# Patient Record
Sex: FEMALE | Race: WHITE | NOT HISPANIC OR LATINO | Employment: UNEMPLOYED | URBAN - METROPOLITAN AREA
[De-identification: names, ages, dates, MRNs, and addresses within clinical notes are randomized per-mention and may not be internally consistent; named-entity substitution may affect disease eponyms.]

---

## 2019-04-23 ENCOUNTER — TELEPHONE (OUTPATIENT)
Dept: OBGYN CLINIC | Facility: CLINIC | Age: 29
End: 2019-04-23

## 2020-01-08 ENCOUNTER — HOSPITAL ENCOUNTER (EMERGENCY)
Facility: HOSPITAL | Age: 30
Discharge: HOME | End: 2020-01-08
Attending: EMERGENCY MEDICINE | Admitting: EMERGENCY MEDICINE
Payer: COMMERCIAL

## 2020-01-08 VITALS
HEIGHT: 60 IN | BODY MASS INDEX: 26.27 KG/M2 | DIASTOLIC BLOOD PRESSURE: 88 MMHG | TEMPERATURE: 97.7 F | SYSTOLIC BLOOD PRESSURE: 145 MMHG | RESPIRATION RATE: 18 BRPM | HEART RATE: 89 BPM | OXYGEN SATURATION: 98 % | WEIGHT: 133.82 LBS

## 2020-01-08 DIAGNOSIS — J06.9 VIRAL URI WITH COUGH: Primary | ICD-10-CM

## 2020-01-08 PROCEDURE — 99284 EMERGENCY DEPT VISIT MOD MDM: CPT | Performed by: PHYSICIAN ASSISTANT

## 2020-01-08 PROCEDURE — 99283 EMERGENCY DEPT VISIT LOW MDM: CPT

## 2020-01-08 RX ORDER — ALBUTEROL SULFATE 90 UG/1
2 AEROSOL, METERED RESPIRATORY (INHALATION) EVERY 6 HOURS PRN
Qty: 1 INHALER | Refills: 0 | Status: SHIPPED | OUTPATIENT
Start: 2020-01-08 | End: 2020-02-07

## 2020-01-08 RX ORDER — BENZONATATE 100 MG/1
100 CAPSULE ORAL 3 TIMES DAILY PRN
Qty: 20 CAPSULE | Refills: 0 | Status: SHIPPED | OUTPATIENT
Start: 2020-01-08

## 2020-01-10 ENCOUNTER — HOSPITAL ENCOUNTER (EMERGENCY)
Facility: HOSPITAL | Age: 30
Discharge: HOME | End: 2020-01-10
Attending: EMERGENCY MEDICINE
Payer: COMMERCIAL

## 2020-01-10 VITALS
BODY MASS INDEX: 25.97 KG/M2 | OXYGEN SATURATION: 97 % | TEMPERATURE: 99.1 F | HEART RATE: 112 BPM | SYSTOLIC BLOOD PRESSURE: 148 MMHG | DIASTOLIC BLOOD PRESSURE: 84 MMHG | RESPIRATION RATE: 18 BRPM | WEIGHT: 133 LBS

## 2020-01-10 DIAGNOSIS — J11.1 INFLUENZA: ICD-10-CM

## 2020-01-10 DIAGNOSIS — J40 BRONCHITIS: Primary | ICD-10-CM

## 2020-01-10 LAB
FLUAV RNA NPH QL NAA+PROBE: ABNORMAL
FLUBV RNA NPH QL NAA+PROBE: DETECTED
RSV RNA NPH QL NAA+PROBE: ABNORMAL

## 2020-01-10 PROCEDURE — 87631 RESP VIRUS 3-5 TARGETS: CPT | Performed by: PHYSICIAN ASSISTANT

## 2020-01-10 PROCEDURE — 99284 EMERGENCY DEPT VISIT MOD MDM: CPT | Performed by: PHYSICIAN ASSISTANT

## 2020-01-10 PROCEDURE — 99283 EMERGENCY DEPT VISIT LOW MDM: CPT

## 2020-01-10 RX ORDER — PREDNISONE 20 MG/1
60 TABLET ORAL ONCE
Status: COMPLETED | OUTPATIENT
Start: 2020-01-10 | End: 2020-01-10

## 2020-01-10 RX ORDER — PREDNISONE 20 MG/1
40 TABLET ORAL DAILY
Qty: 8 TABLET | Refills: 0 | Status: SHIPPED | OUTPATIENT
Start: 2020-01-10 | End: 2020-01-14

## 2020-01-10 RX ADMIN — PREDNISONE 60 MG: 20 TABLET ORAL at 09:30

## 2020-01-10 NOTE — ED PROVIDER NOTES
History  Chief Complaint   Patient presents with    Fever - 9 weeks to 74 years     pt seen in [facility] ED 3 days ago for same  c/o fever, body aches, decreased appetite  Denies nausea/vomiting/diarrhea    Cough     non productive x 10 days     33 y/o female presenting with a dry nonproductive cough x3 days  Patient states that she was sick about 2 weeks ago and her cough had improved and resolution of symptoms occurred however then started with a cough once again over the past 2 days with low-grade temperatures at 100 5  Afebrile today, did not take any medication for her fevers  She is taking over-the-counter cough medication as well as her inhaler however persists with a dry nonproductive cough  Prior history of smoking  Concerned as she does have little once in the home  She does not have a family doctor in this area  Mild shortness of breath with the cough  Denies chest pain, nausea, vomiting, diarrhea, difficulty swallowing, facial pain or pressure  Prior to Admission Medications   Prescriptions Last Dose Informant Patient Reported? Taking? albuterol (PROVENTIL HFA,VENTOLIN HFA) 90 mcg/act inhaler 1/10/2020 at Unknown time  No Yes   Sig: Inhale 2 puffs every 6 (six) hours as needed for wheezing or shortness of breath   benzonatate (TESSALON PERLES) 100 mg capsule 1/9/2020 at Unknown time  No Yes   Sig: Take 1 capsule (100 mg total) by mouth 3 (three) times a day as needed for cough      Facility-Administered Medications: None       History reviewed  No pertinent past medical history  History reviewed  No pertinent surgical history  History reviewed  No pertinent family history  I have reviewed and agree with the history as documented  Social History     Tobacco Use    Smoking status: Former Smoker    Smokeless tobacco: Never Used   Substance Use Topics    Alcohol use: Yes     Comment: occasionally    Drug use: Never        Review of Systems   Constitutional: Positive for fever  Negative for activity change, appetite change, chills, diaphoresis and fatigue  HENT: Negative  Negative for congestion, dental problem, ear pain, facial swelling, mouth sores, postnasal drip, sinus pressure, sore throat and trouble swallowing  Eyes: Negative  Respiratory: Positive for cough  Negative for apnea, choking, chest tightness, shortness of breath, wheezing and stridor  Cardiovascular: Negative  Negative for chest pain  Gastrointestinal: Negative  Negative for abdominal distention, abdominal pain, blood in stool, constipation, diarrhea, nausea and vomiting  Genitourinary: Negative  Musculoskeletal: Negative  Negative for arthralgias, neck pain and neck stiffness  Skin: Negative  Negative for rash  Neurological: Negative  Negative for facial asymmetry and headaches  All other systems reviewed and are negative  Physical Exam  Physical Exam   Constitutional: She is oriented to person, place, and time  She appears well-developed and well-nourished  No distress  HENT:   Head: Normocephalic and atraumatic  Right Ear: External ear normal    Left Ear: External ear normal    Nose: Nose normal    Mouth/Throat: Oropharynx is clear and moist  No oropharyngeal exudate  Mild amount of erythema noted  No swelling, exudate, or uvula deviation noted of mouth  No discoloration, asymmetries or swelling of the face  No ulcerations, fluctuance, induration or drainage noted  No petechiae noted on palate  Able to swallow without difficulty  Full ROM of jaw  Eyes: Pupils are equal, round, and reactive to light  Conjunctivae are normal  Right eye exhibits no discharge  Left eye exhibits no discharge  No scleral icterus  Neck: Normal range of motion  Neck supple  No tracheal deviation present  Cardiovascular: Normal rate, regular rhythm, normal heart sounds and intact distal pulses  Exam reveals no friction rub  No murmur heard    Pulmonary/Chest: Effort normal and breath sounds normal  No stridor  No respiratory distress  She has no wheezes  She has no rales  She exhibits no tenderness  SpO2 is 97%, within normal limits, resting comfortably  No cyanosis or pallor  Patient speaking full sentences without any difficulty, no shortness of breath  No wheezes, rhonchi or crackles noted on exam    Abdominal: Soft  Bowel sounds are normal  She exhibits no distension  There is no tenderness  There is no rebound and no guarding  Lymphadenopathy:     She has no cervical adenopathy  Neurological: She is alert and oriented to person, place, and time  Skin: Skin is warm and dry  Capillary refill takes less than 2 seconds  No rash noted  She is not diaphoretic  No erythema  No pallor  No sandpaper rash noted  No other rashes noted  Good coloration of skin  Nursing note and vitals reviewed  Vital Signs  ED Triage Vitals   Temperature Pulse Respirations Blood Pressure SpO2   01/10/20 0857 01/10/20 0857 01/10/20 0857 01/10/20 0857 01/10/20 0857   99 1 °F (37 3 °C) (!) 112 18 148/84 97 %      Temp Source Heart Rate Source Patient Position - Orthostatic VS BP Location FiO2 (%)   01/10/20 0857 -- 01/10/20 0857 01/10/20 0857 --   Oral  Sitting Right arm       Pain Score       01/10/20 0859       8           Vitals:    01/10/20 0857   BP: 148/84   Pulse: (!) 112   Patient Position - Orthostatic VS: Sitting         Visual Acuity      ED Medications  Medications   predniSONE tablet 60 mg (60 mg Oral Given 1/10/20 0930)       Diagnostic Studies  Results Reviewed     Procedure Component Value Units Date/Time    Influenza A/B and RSV PCR [[id number]] Collected:  01/10/20 0930    Lab Status:  No result Specimen:  Nasopharyngeal Swab                  No orders to display              Procedures  Procedures         ED Course                               MDM  Number of Diagnoses or Management Options  Diagnosis management comments:  Will swab for flu per patient request however discussed with patient that she likely continues with a viral illness and early signs of bronchitis as she has had history of this before in the past   Will treat symptomatically  Patient is informed to return to the emergency department for worsening of symptoms and was given proper education regarding their diagnosis and symptoms  Otherwise the patient is informed to follow up with their primary care doctor for re-evaluation  The patient verbalizes understanding and agrees with above assessment and plan  All questions were answered  Please Note: Fluency Direct voice recognition software may have been used in the creation of this document  Wrong words or sound a like substitutions may have occurred due to the inherent limitations of the voice software  Amount and/or Complexity of Data Reviewed  Clinical lab tests: ordered  Review and summarize past medical records: yes  Independent visualization of images, tracings, or specimens: yes          Disposition  Final diagnoses:   Bronchitis     Time reflects when diagnosis was documented in both MDM as applicable and the Disposition within this note     Time User Action Codes Description Comment    1/10/2020  9:29 AM Haydee Hunt Add [J40] Bronchitis       ED Disposition     ED Disposition Condition Date/Time Comment    Discharge Stable Fri Pj 10, 2020  9:29 AM Gus Murillo discharge to home/self care              Follow-up Information     Follow up With Specialties Details Why Contact Info Additional [address] Emergency Department Emergency Medicine Go to  If symptoms worsen such as chest pain, high fevers etc [address] [facility]  [phone] [facility], [place], [place], [address] [place] [address]  Schedule an appointment as soon as possible for a visit   Ching Carter [phone]           Patient's Medications   Discharge Prescriptions    PREDNISONE 20 MG TABLET    Take 2 tablets (40 mg total) by mouth daily for 4 days       Start Date: 1/10/2020 End Date: 1/14/2020       Order Dose: 40 mg       Quantity: 8 tablet    Refills: 0     No discharge procedures on file      ED Provider  Electronically Signed by           Renaldo Huang PA-C  01/10/20 0912

## 2020-03-04 ENCOUNTER — HOSPITAL ENCOUNTER (EMERGENCY)
Facility: HOSPITAL | Age: 30
Discharge: HOME | End: 2020-03-04
Attending: EMERGENCY MEDICINE | Admitting: EMERGENCY MEDICINE
Payer: COMMERCIAL

## 2020-03-04 VITALS
OXYGEN SATURATION: 98 % | SYSTOLIC BLOOD PRESSURE: 123 MMHG | HEART RATE: 110 BPM | WEIGHT: 130 LBS | DIASTOLIC BLOOD PRESSURE: 74 MMHG | RESPIRATION RATE: 16 BRPM | TEMPERATURE: 97.6 F | BODY MASS INDEX: 25.39 KG/M2

## 2020-03-04 DIAGNOSIS — N93.9 VAGINAL BLEEDING: Primary | ICD-10-CM

## 2020-03-04 LAB
ANION GAP SERPL CALCULATED.3IONS-SCNC: 9 MMOL/L (ref 4–13)
B-HCG SERPL-ACNC: 778 MIU/ML
BACTERIA UR QL AUTO: ABNORMAL /HPF
BASOPHILS # BLD AUTO: 0.04 THOUSANDS/ΜL (ref 0–0.1)
BASOPHILS NFR BLD AUTO: 1 % (ref 0–1)
BILIRUB UR QL STRIP: NEGATIVE
BUN SERPL-MCNC: 11 MG/DL (ref 5–25)
CALCIUM SERPL-MCNC: 8.5 MG/DL (ref 8.3–10.1)
CHLORIDE SERPL-SCNC: 103 MMOL/L (ref 100–108)
CLARITY UR: CLEAR
CO2 SERPL-SCNC: 26 MMOL/L (ref 21–32)
COLOR UR: YELLOW
CREAT SERPL-MCNC: 0.67 MG/DL (ref 0.6–1.3)
EOSINOPHIL # BLD AUTO: 0.12 THOUSAND/ΜL (ref 0–0.61)
EOSINOPHIL NFR BLD AUTO: 2 % (ref 0–6)
ERYTHROCYTE [DISTWIDTH] IN BLOOD BY AUTOMATED COUNT: 13 % (ref 11.6–15.1)
EXT PREG TEST URINE: POSITIVE
EXT. CONTROL ED NAV: NORMAL
GFR SERPL CREATININE-BSD FRML MDRD: 119 ML/MIN/1.73SQ M
GLUCOSE SERPL-MCNC: 98 MG/DL (ref 65–140)
GLUCOSE UR STRIP-MCNC: NEGATIVE MG/DL
HCT VFR BLD AUTO: 38.1 % (ref 34.8–46.1)
HGB BLD-MCNC: 12.5 G/DL (ref 11.5–15.4)
HGB UR QL STRIP.AUTO: ABNORMAL
IMM GRANULOCYTES # BLD AUTO: 0.02 THOUSAND/UL (ref 0–0.2)
IMM GRANULOCYTES NFR BLD AUTO: 0 % (ref 0–2)
KETONES UR STRIP-MCNC: NEGATIVE MG/DL
LEUKOCYTE ESTERASE UR QL STRIP: NEGATIVE
LYMPHOCYTES # BLD AUTO: 2.03 THOUSANDS/ΜL (ref 0.6–4.47)
LYMPHOCYTES NFR BLD AUTO: 26 % (ref 14–44)
MCH RBC QN AUTO: 29.7 PG (ref 26.8–34.3)
MCHC RBC AUTO-ENTMCNC: 32.8 G/DL (ref 31.4–37.4)
MCV RBC AUTO: 91 FL (ref 82–98)
MONOCYTES # BLD AUTO: 0.6 THOUSAND/ΜL (ref 0.17–1.22)
MONOCYTES NFR BLD AUTO: 8 % (ref 4–12)
MUCOUS THREADS UR QL AUTO: ABNORMAL
NEUTROPHILS # BLD AUTO: 5.09 THOUSANDS/ΜL (ref 1.85–7.62)
NEUTS SEG NFR BLD AUTO: 63 % (ref 43–75)
NITRITE UR QL STRIP: NEGATIVE
NON-SQ EPI CELLS URNS QL MICRO: ABNORMAL /HPF
NRBC BLD AUTO-RTO: 0 /100 WBCS
PH UR STRIP.AUTO: 5.5 [PH]
PLATELET # BLD AUTO: 196 THOUSANDS/UL (ref 149–390)
PMV BLD AUTO: 10.8 FL (ref 8.9–12.7)
POTASSIUM SERPL-SCNC: 3.3 MMOL/L (ref 3.5–5.3)
PROT UR STRIP-MCNC: NEGATIVE MG/DL
RBC # BLD AUTO: 4.21 MILLION/UL (ref 3.81–5.12)
RBC #/AREA URNS AUTO: ABNORMAL /HPF
SODIUM SERPL-SCNC: 138 MMOL/L (ref 136–145)
SP GR UR STRIP.AUTO: >=1.03 (ref 1–1.03)
UROBILINOGEN UR QL STRIP.AUTO: 0.2 E.U./DL
WBC # BLD AUTO: 7.9 THOUSAND/UL (ref 4.31–10.16)
WBC #/AREA URNS AUTO: ABNORMAL /HPF

## 2020-03-04 PROCEDURE — 84702 CHORIONIC GONADOTROPIN TEST: CPT | Performed by: EMERGENCY MEDICINE

## 2020-03-04 PROCEDURE — 99282 EMERGENCY DEPT VISIT SF MDM: CPT | Performed by: EMERGENCY MEDICINE

## 2020-03-04 PROCEDURE — 99284 EMERGENCY DEPT VISIT MOD MDM: CPT

## 2020-03-04 PROCEDURE — 36415 COLL VENOUS BLD VENIPUNCTURE: CPT | Performed by: EMERGENCY MEDICINE

## 2020-03-04 PROCEDURE — 80048 BASIC METABOLIC PNL TOTAL CA: CPT | Performed by: EMERGENCY MEDICINE

## 2020-03-04 PROCEDURE — 81025 URINE PREGNANCY TEST: CPT | Performed by: EMERGENCY MEDICINE

## 2020-03-04 PROCEDURE — 85025 COMPLETE CBC W/AUTO DIFF WBC: CPT | Performed by: EMERGENCY MEDICINE

## 2020-03-04 PROCEDURE — 81001 URINALYSIS AUTO W/SCOPE: CPT | Performed by: EMERGENCY MEDICINE

## 2020-03-04 NOTE — ED NOTES
Pt states she can "no longer wait jan I have to get my kid"  Dr Jane Antonio notified  Pt dispo to left after provider evaluation        Melody Larkin RN  03/04/20 1547

## 2020-03-04 NOTE — ED PROVIDER NOTES
History  Chief Complaint   Patient presents with    Vaginal Bleeding - Pregnant     states had heavy bleeding yesterday   states took two pregnancy tests yesterday and they were both positive  no bleeding or pain today     Patient is a  2 para 2 with LMP was  to   Patient is usually quite regular, and when she missed her period this week she took a pregnancy test yesterday twice which was positive  Patient started having some spotting, followed by more heavy menstrual flow including clots  Today she says she has only light bleeding and no abdominal pain  She denies cramps, no fever, no dysuria no  vomiting, but she states she has mild nausea which she usually gets during her menstrual cycles  Prior to Admission Medications   Prescriptions Last Dose Informant Patient Reported? Taking?   benzonatate (TESSALON PERLES) 100 mg capsule Not Taking at Unknown time  No No   Sig: Take 1 capsule (100 mg total) by mouth 3 (three) times a day as needed for cough   Patient not taking: Reported on 3/4/2020      Facility-Administered Medications: None       History reviewed  No pertinent past medical history  History reviewed  No pertinent surgical history  History reviewed  No pertinent family history  I have reviewed and agree with the history as documented  E-Cigarette/Vaping    E-Cigarette Use Never User      E-Cigarette/Vaping Substances     Social History     Tobacco Use    Smoking status: Former Smoker    Smokeless tobacco: Never Used   Substance Use Topics    Alcohol use: Yes     Comment: occasionally    Drug use: Never       Review of Systems   Constitutional: Negative for chills and fever  HENT: Negative for congestion and sore throat  Eyes: Negative for visual disturbance  Respiratory: Negative for cough and shortness of breath  Gastrointestinal: Positive for nausea  Negative for abdominal pain and vomiting     Genitourinary: Positive for menstrual problem and vaginal bleeding  Negative for dysuria, hematuria, vaginal discharge and vaginal pain  Musculoskeletal: Negative for back pain  Skin: Negative for rash  Neurological: Negative for weakness and headaches  Hematological: Does not bruise/bleed easily  Psychiatric/Behavioral: Negative for confusion  All other systems reviewed and are negative  Physical Exam  Physical Exam   Constitutional: She is oriented to person, place, and time  She appears well-developed and well-nourished  HENT:   Head: Normocephalic and atraumatic  Eyes: Conjunctivae are normal    Neck: Normal range of motion  Neck supple  Cardiovascular: Normal rate, regular rhythm and normal heart sounds  Pulmonary/Chest: Effort normal    Abdominal: Soft  Bowel sounds are normal  There is no tenderness  Musculoskeletal: Normal range of motion  Neurological: She is alert and oriented to person, place, and time  Skin: Skin is warm and dry  Capillary refill takes less than 2 seconds  Psychiatric: She has a normal mood and affect  Her behavior is normal    Nursing note and vitals reviewed        Vital Signs  ED Triage Vitals [03/04/20 1151]   Temperature Pulse Respirations Blood Pressure SpO2   97 6 °F (36 4 °C) (!) 110 16 123/74 98 %      Temp Source Heart Rate Source Patient Position - Orthostatic VS BP Location FiO2 (%)   Tympanic Monitor -- -- --      Pain Score       No Pain           Vitals:    03/04/20 1151   BP: 123/74   Pulse: (!) 110         Visual Acuity      ED Medications  Medications - No data to display    Diagnostic Studies  Results Reviewed     Procedure Component Value Units Date/Time    Quantitative hCG [[id number]]  (Abnormal) Collected:  03/04/20 1233    Lab Status:  Final result Specimen:  Blood from Arm, Left Updated:  03/04/20 1349     HCG, Quant 778 mIU/mL     Narrative:        Expected Ranges:     Approximate               Approximate HCG  Gestation age          Concentration ( mIU/mL)  _____________ ______________________   Dulcy Pluck                      HCG values  0 2-1                       5-50  1-2                           2-3                         100-5000  3-4                         500-65670  4-5                         1000-25436  5-6                         [id number]  6-8                         [id number]  8-12                        10207-622580      Basic metabolic panel [[id number]]  (Abnormal) Collected:  03/04/20 1233    Lab Status:  Final result Specimen:  Blood from Arm, Left Updated:  03/04/20 1302     Sodium 138 mmol/L      Potassium 3 3 mmol/L      Chloride 103 mmol/L      CO2 26 mmol/L      ANION GAP 9 mmol/L      BUN 11 mg/dL      Creatinine 0 67 mg/dL      Glucose 98 mg/dL      Calcium 8 5 mg/dL      eGFR 119 ml/min/1 73sq m     Narrative:       [facility] guidelines for Chronic Kidney Disease (CKD):     Stage 1 with normal or high GFR (GFR > 90 mL/min/1 73 square meters)    Stage 2 Mild CKD (GFR = 60-89 mL/min/1 73 square meters)    Stage 3A Moderate CKD (GFR = 45-59 mL/min/1 73 square meters)    Stage 3B Moderate CKD (GFR = 30-44 mL/min/1 73 square meters)    Stage 4 Severe CKD (GFR = 15-29 mL/min/1 73 square meters)    Stage 5 End Stage CKD (GFR <15 mL/min/1 73 square meters)  Note: GFR calculation is accurate only with a steady state creatinine    Urine Microscopic [[id number]]  (Abnormal) Collected:  03/04/20 1233    Lab Status:  Final result Specimen:  Urine, Clean Catch Updated:  03/04/20 1254     RBC, UA 1-2 /hpf      WBC, UA 1-2 /hpf      Epithelial Cells Occasional /hpf      Bacteria, UA Occasional /hpf      MUCUS THREADS Occasional    UA (URINE) with reflex to Scope [[id number]]  (Abnormal) Collected:  03/04/20 1233    Lab Status:  Final result Specimen:  Urine, Clean Catch Updated:  03/04/20 1243     Color, UA Yellow     Clarity, UA Clear     Specific Gravity, UA >=1 030     pH, UA 5 5     Leukocytes, UA Negative     Nitrite, UA Negative Protein, UA Negative mg/dl      Glucose, UA Negative mg/dl      Ketones, UA Negative mg/dl      Urobilinogen, UA 0 2 E U /dl      Bilirubin, UA Negative     Blood, UA Small    CBC and differential [[id number]] Collected:  03/04/20 1233    Lab Status:  Final result Specimen:  Blood from Arm, Left Updated:  03/04/20 1239     WBC 7 90 Thousand/uL      RBC 4 21 Million/uL      Hemoglobin 12 5 g/dL      Hematocrit 38 1 %      MCV 91 fL      MCH 29 7 pg      MCHC 32 8 g/dL      RDW 13 0 %      MPV 10 8 fL      Platelets 421 Thousands/uL      nRBC 0 /100 WBCs      Neutrophils Relative 63 %      Immat GRANS % 0 %      Lymphocytes Relative 26 %      Monocytes Relative 8 %      Eosinophils Relative 2 %      Basophils Relative 1 %      Neutrophils Absolute 5 09 Thousands/µL      Immature Grans Absolute 0 02 Thousand/uL      Lymphocytes Absolute 2 03 Thousands/µL      Monocytes Absolute 0 60 Thousand/µL      Eosinophils Absolute 0 12 Thousand/µL      Basophils Absolute 0 04 Thousands/µL     POCT pregnancy, urine [[id number]]  (Normal) Resulted:  03/04/20 1238    Lab Status:  Final result Updated:  03/04/20 1238     EXT PREG TEST UR (Ref: Negative) positive     Control valid                 No orders to display              Procedures  Procedures         ED Course                               MDM  Number of Diagnoses or Management Options  Diagnosis management comments: Will check HCG level  Patient states she can weight no longer she has take care of her other children  We do not have beta-hCG back at the time that she says she needs to leave  Patient walked out but understood that I will call her when I get these results  She already has a follow-up appointment with her OBGYN for this Saturday          Disposition  Final diagnoses:   Vaginal bleeding     Time reflects when diagnosis was documented in both MDM as applicable and the Disposition within this note     Time User Action Codes Description Comment    3/4/2020 2:26 PM Sophia López Add [N93 9] Vaginal bleeding       ED Disposition     ED Disposition Condition Date/Time Comment    Left from Room after Provider Exam  Wed Mar 4, 2020  1:46 PM       Follow-up Information     Follow up With Specialties Details Why Contact Info    OBGYN   Keep appointment this Saturday           Discharge Medication List as of 3/4/2020  2:27 PM      CONTINUE these medications which have NOT CHANGED    Details   benzonatate (TESSALON PERLES) 100 mg capsule Take 1 capsule (100 mg total) by mouth 3 (three) times a day as needed for cough, Starting Wed 1/8/2020, Normal           No discharge procedures on file      PDMP Review     None          ED Provider  Electronically Signed by           Adriana Garcia MD  03/04/20 6238

## 2020-09-03 ENCOUNTER — HOSPITAL ENCOUNTER (OUTPATIENT)
Facility: HOSPITAL | Age: 30
Discharge: HOME | End: 2020-09-03
Attending: OBSTETRICS & GYNECOLOGY | Admitting: OBSTETRICS & GYNECOLOGY
Payer: COMMERCIAL

## 2020-09-03 VITALS
DIASTOLIC BLOOD PRESSURE: 69 MMHG | BODY MASS INDEX: 29.45 KG/M2 | HEIGHT: 60 IN | SYSTOLIC BLOOD PRESSURE: 112 MMHG | HEART RATE: 89 BPM | TEMPERATURE: 98.1 F | WEIGHT: 150 LBS | RESPIRATION RATE: 20 BRPM

## 2020-09-03 PROBLEM — Z3A.30 30 WEEKS GESTATION OF PREGNANCY: Status: ACTIVE | Noted: 2020-09-03

## 2020-09-03 PROCEDURE — NC001 PR NO CHARGE: Performed by: OBSTETRICS & GYNECOLOGY

## 2020-09-03 PROCEDURE — 76817 TRANSVAGINAL US OBSTETRIC: CPT | Performed by: OBSTETRICS & GYNECOLOGY

## 2020-09-03 PROCEDURE — 99214 OFFICE O/P EST MOD 30 MIN: CPT

## 2020-09-03 NOTE — PROCEDURES
Wilber Mcclendon, ge[place]  at 30w5d with an SUYAPA of 2020, by Patient Reported, was seen at [address] for the following procedure(s): $Procedure Type: US - Transvaginal]                   Ultrasound Other  Fetal Presentation: Breech  Cervical Length: 4 31- 4 78 cm         Ultrasound Probe Disinfection    A transvaginal ultrasound was performed     Prior to use, disinfection was performed with High Level Disinfection Process (Trophon)  Probe serial number [id number] was used    Alana Espinal MD  20  1:39 PM

## 2020-09-03 NOTE — PROGRESS NOTES
Triage Note - OB  Kim Ortega 27 y o  female MRN: [id number]  Unit/Bed#:  TRI[facility] 4- Encounter: [id number]    OB TRIAGE NOTE  Kim Ortega  [id number]  9/3/2020  1:41 PM  LD TRIAGE 4/LD TRIAGE 4-*    ASSESS:  27 y o   30w5d with cramping    PLAN  #1  Cramping:   · KOH/Wet mount negative  · Cervical length 4 31 - 4 78 cm  · SVE closed/thick/high  · Reactive NST, no contractions on toco  · Discharged home with precautions; will follow up with home OB    Discharge instructions  · Patient instructed to call if experiencing worsening contractions, vaginal bleeding, loss of fluid or decreased fetal movement  · Will follow up with OBGYN  D/w Dr Stacia Pitt  ______________    SUBJECTIVE    SUYAPA: Estimated Date of Delivery: 20    HPI Chronology:  27 y o   30w5d presents with complaint of cramping for the past three days with approximately 4 contractions per hour  She does not think she broke her water yet and reports normal fetal movement  She reports an uncomplicated pregnancy other than breech presentation and has no other pertinent medical history  Contractions: yes  Leakage: no  Bleeding: no  Fetal Movement: yes  Pelvic pain: yes    Vitals:   /69   Pulse 89   Temp 98 1 °F (36 7 °C)   Resp 20   Ht 5' (1 524 m)   Wt 68 kg (150 lb)   LMP 2020   BMI 29 29 kg/m²   Body mass index is 29 29 kg/m²  Physical Exam  Constitutional:       Appearance: She is well-developed  HENT:      Head: Normocephalic and atraumatic  Eyes:      Conjunctiva/sclera: Conjunctivae normal    Neck:      Musculoskeletal: Normal range of motion and neck supple  Cardiovascular:      Rate and Rhythm: Normal rate and regular rhythm  Heart sounds: Normal heart sounds  Pulmonary:      Effort: Pulmonary effort is normal  No respiratory distress  Breath sounds: Normal breath sounds  Abdominal:      Palpations: Abdomen is soft  Tenderness: There is no abdominal tenderness   There is no guarding or rebound  Genitourinary:     Vagina: No vaginal discharge  Musculoskeletal: Normal range of motion  General: No tenderness  Skin:     General: Skin is warm and dry  Neurological:      Mental Status: She is alert and oriented to person, place, and time  Psychiatric:         Behavior: Behavior normal          Thought Content: Thought content normal          Speculum exam: small amount of normal appearing white vaginal discharge  KOH/wet mount: negative    FHT:  Baseline Rate: 145 bpm  Variability: Moderate 6-25 bpm  Accelerations: 10 x 10 (<32 weeks), At variable times  Decelerations: None  TOCO:   Contraction Frequency (minutes): 0  Contraction Duration (seconds): 60  Contraction Quality: Mild    Labs: No results found for this or any previous visit (from the past 24 hour(s))  Lab, Imaging and other studies: I have personally reviewed pertinent reports          Lo Rosario MD  OB/GYN PGY-1  9/3/2020  1:41 PM

## 2021-02-08 ENCOUNTER — TRANSCRIBE ORDERS (OUTPATIENT)
Dept: ADMINISTRATIVE | Facility: HOSPITAL | Age: 31
End: 2021-02-08

## 2021-02-08 DIAGNOSIS — R10.9 UNSPECIFIED ABDOMINAL PAIN: Primary | ICD-10-CM

## 2021-03-06 ENCOUNTER — HOSPITAL ENCOUNTER (OUTPATIENT)
Dept: RADIOLOGY | Facility: HOSPITAL | Age: 31
Discharge: HOME | End: 2021-03-06
Payer: COMMERCIAL

## 2021-03-06 DIAGNOSIS — R10.9 UNSPECIFIED ABDOMINAL PAIN: ICD-10-CM

## 2021-03-06 PROCEDURE — 76830 TRANSVAGINAL US NON-OB: CPT

## 2021-03-06 PROCEDURE — 76856 US EXAM PELVIC COMPLETE: CPT

## 2021-03-06 PROCEDURE — 76700 US EXAM ABDOM COMPLETE: CPT

## 2021-06-09 ENCOUNTER — TRANSCRIBE ORDERS (OUTPATIENT)
Dept: ADMINISTRATIVE | Facility: HOSPITAL | Age: 31
End: 2021-06-09

## 2021-06-09 DIAGNOSIS — Z86.718 HX OF BLOOD CLOTS: ICD-10-CM

## 2021-06-09 DIAGNOSIS — R10.9 ABDOMINAL PAIN AFFECTING PREGNANCY, ANTEPARTUM: Primary | ICD-10-CM

## 2021-06-09 DIAGNOSIS — O26.899 ABDOMINAL PAIN AFFECTING PREGNANCY, ANTEPARTUM: Primary | ICD-10-CM

## 2021-06-18 ENCOUNTER — HOSPITAL ENCOUNTER (OUTPATIENT)
Dept: RADIOLOGY | Facility: HOSPITAL | Age: 31
Discharge: HOME | End: 2021-06-18
Payer: COMMERCIAL

## 2021-06-18 DIAGNOSIS — O26.899 ABDOMINAL PAIN AFFECTING PREGNANCY, ANTEPARTUM: ICD-10-CM

## 2021-06-18 DIAGNOSIS — Z86.718 HX OF BLOOD CLOTS: ICD-10-CM

## 2021-06-18 DIAGNOSIS — R10.9 ABDOMINAL PAIN AFFECTING PREGNANCY, ANTEPARTUM: ICD-10-CM

## 2021-06-18 PROCEDURE — G1004 CDSM NDSC: HCPCS

## 2021-06-18 PROCEDURE — 74177 CT ABD & PELVIS W/CONTRAST: CPT

## 2021-06-18 RX ADMIN — IOHEXOL 100 ML: 350 INJECTION, SOLUTION INTRAVENOUS at 09:46
